# Patient Record
Sex: MALE | Race: WHITE | URBAN - METROPOLITAN AREA
[De-identification: names, ages, dates, MRNs, and addresses within clinical notes are randomized per-mention and may not be internally consistent; named-entity substitution may affect disease eponyms.]

---

## 2023-01-16 ENCOUNTER — OFFICE VISIT (OUTPATIENT)
Dept: URBAN - METROPOLITAN AREA CLINIC 90 | Facility: CLINIC | Age: 73
End: 2023-01-16
Payer: MEDICARE

## 2023-01-16 DIAGNOSIS — H25.13 AGE-RELATED NUCLEAR CATARACT, BILATERAL: ICD-10-CM

## 2023-01-16 DIAGNOSIS — H43.813 VITREOUS DEGENERATION, BILATERAL: ICD-10-CM

## 2023-01-16 DIAGNOSIS — H35.033 HYPERTENSIVE RETINOPATHY, BILATERAL: Primary | ICD-10-CM

## 2023-01-16 DIAGNOSIS — H35.372 PUCKERING OF MACULA, LEFT EYE: ICD-10-CM

## 2023-01-16 DIAGNOSIS — H11.002 PTERYGIUM OF LT EYE: ICD-10-CM

## 2023-01-16 PROCEDURE — 99204 OFFICE O/P NEW MOD 45 MIN: CPT | Performed by: OPHTHALMOLOGY

## 2023-01-16 ASSESSMENT — INTRAOCULAR PRESSURE
OS: 13
OD: 11

## 2023-01-16 NOTE — IMPRESSION/PLAN
Impression: Pterygium of lt eye: H11.002. Plan: For pterygium left eye, patient has attempted treatment with artificial tears and topical steroids with little to no relief. Due to its progressive nature, I have recommended excision and removal at this time. All risks and benefits associated with surgery were discussed at length with patient, including recurrence, infection, loss of vision or eye and possible need for further surgery. I discussed option of Mitomycin C treatment and placement of amniotic graft with fibron glue. I explained side effects of [facility] with patient. All questions were answered, and patient wishes to schedule surgery at this time. Patient would like to consider first and will call to schedule Pre op.